# Patient Record
Sex: FEMALE | Race: WHITE | Employment: OTHER | URBAN - METROPOLITAN AREA
[De-identification: names, ages, dates, MRNs, and addresses within clinical notes are randomized per-mention and may not be internally consistent; named-entity substitution may affect disease eponyms.]

---

## 2017-01-27 ENCOUNTER — OFFICE VISIT (OUTPATIENT)
Dept: FAMILY MEDICINE | Facility: CLINIC | Age: 82
End: 2017-01-27
Payer: MEDICARE

## 2017-01-27 VITALS
HEIGHT: 61 IN | WEIGHT: 127 LBS | SYSTOLIC BLOOD PRESSURE: 132 MMHG | BODY MASS INDEX: 23.98 KG/M2 | DIASTOLIC BLOOD PRESSURE: 70 MMHG | HEART RATE: 96 BPM | TEMPERATURE: 97.9 F

## 2017-01-27 DIAGNOSIS — F01.50 VASCULAR DEMENTIA WITHOUT BEHAVIORAL DISTURBANCE (H): Primary | ICD-10-CM

## 2017-01-27 DIAGNOSIS — I69.921 DYSPHASIA DUE TO CEREBROVASCULAR DISEASE: ICD-10-CM

## 2017-01-27 PROCEDURE — 99214 OFFICE O/P EST MOD 30 MIN: CPT | Performed by: FAMILY MEDICINE

## 2017-01-27 NOTE — NURSING NOTE
"Chief Complaint   Patient presents with     Consult       Initial /70 mmHg  Pulse 96  Temp(Src) 97.9  F (36.6  C) (Tympanic)  Ht 5' 1\" (1.549 m)  Wt 127 lb (57.607 kg)  BMI 24.01 kg/m2 Estimated body mass index is 24.01 kg/(m^2) as calculated from the following:    Height as of this encounter: 5' 1\" (1.549 m).    Weight as of this encounter: 127 lb (57.607 kg).  BP completed using cuff size: sandra Gray CMA    "

## 2017-01-27 NOTE — PROGRESS NOTES
SUBJECTIVE:                                                    Miranda Burger is a 83 year old female who presents to clinic today for the following health issues:      Consult    Problem list and histories reviewed & adjusted, as indicated.  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     Dementia, vascular     Past Surgical History   Procedure Laterality Date     Surgical history of -   74     Colonoscopy  04     Tonsillectomy       Breast biopsy, fna rt/lt  95     Lt,      Surgical history of -        Excision large sebaceous cyst, posterior scalp, compound x2     Surgical history of -   07     cataract os     Laparoscopic assisted colectomy  2014     Procedure: LAPAROSCOPIC ASSISTED COLECTOMY;  LAPAROSCOPIC ASSISTED RIGHT HEMICOLECTOMY, CHOLECYSTECTOMY;  Surgeon: Hernandez Hancock MD;  Location:  OR     Cholecystectomy  2014     Procedure: CHOLECYSTECTOMY;;  Surgeon: Hernandez Hancock MD;  Location:  OR       Social History   Substance Use Topics     Smoking status: Never Smoker      Smokeless tobacco: Never Used     Alcohol Use: Yes      Comment: social     Family History   Problem Relation Age of Onset     DIABETES Mother      decesed     DIABETES Sister           Alzheimer Disease Mother      dementia     Cardiovascular Father      decesed         Current Outpatient Prescriptions   Medication Sig Dispense Refill     fluocinonide (LIDEX) 0.05 % cream Apply sparingly to affected area twice daily for 14 days.  Do not  "apply to face. 30 g 1     Calcium Carbonate-Vitamin D (CALCIUM 600 + D OR) Take 2 capsules by mouth daily.       ferrous sulfate 140 (45 FE) MG TBCR Take 2 tablets by mouth daily.       omeprazole (PRILOSEC) 20 MG capsule Take 1 capsule by mouth 2 times daily. 60 capsule 5     VITAMIN D 1000 UNIT PO CAPS 2/day       MULTI-VITAMIN OR TABS None Entered       Allergies   Allergen Reactions     Penicillin [Penicillins] Hives     Sulfa Drugs Hives     Recent Labs   Lab Test  07/25/16   1201  04/30/15   1017  09/04/14   1203   12/23/13   0857  11/09/12   1147   A1C   --    --    --    --    --   5.2   LDL  103*  81  64   --   98  93   HDL  67   --   70   --   45*  53   TRIG  76   --   106   --   101  64   ALT   --   23  18   --   22  42   CR  0.91  0.94  0.89   < >  0.86  0.85   GFRESTIMATED  59*  57*  61   < >  64  65   GFRESTBLACK  72  69  73   < >  77  78   POTASSIUM  4.4  4.5  4.5   < >  4.7  4.5   TSH   --    --   0.78   --   0.88  0.49    < > = values in this interval not displayed.      BP Readings from Last 3 Encounters:   01/27/17 132/70   10/10/16 128/72   07/25/16 126/76    Wt Readings from Last 3 Encounters:   01/27/17 127 lb (57.607 kg)   10/10/16 127 lb (57.607 kg)   07/25/16 124 lb (56.246 kg)                    ROS:  Constitutional, HEENT, cardiovascular, pulmonary, gi and gu systems are negative, except as otherwise noted.    OBJECTIVE:                                                    /70 mmHg  Pulse 96  Temp(Src) 97.9  F (36.6  C) (Tympanic)  Ht 5' 1\" (1.549 m)  Wt 127 lb (57.607 kg)  BMI 24.01 kg/m2  Body mass index is 24.01 kg/(m^2).  GENERAL: healthy, alert and no distress  NECK: no adenopathy, no asymmetry, masses, or scars and thyroid normal to palpation  RESP: lungs clear to auscultation - no rales, rhonchi or wheezes  CV: regular rate and rhythm, normal S1 S2, no S3 or S4, no murmur, click or rub, no peripheral edema and peripheral pulses strong  ABDOMEN: soft, nontender, no " hepatosplenomegaly, no masses and bowel sounds normal  MS: no gross musculoskeletal defects noted, no edema         ASSESSMENT/PLAN:                                                    ASSESSMENT / PLAN:  (F01.50) Vascular dementia without behavioral disturbance  (primary encounter diagnosis)  Comment: has been worsening,   Plan: need to consider placement as was discussed in the past(with caregiver), family member already scheduled to see geriatrics for cognitive testing, which hopefully make her to moving forward to memory care placement with caregiver's agreement     (I69.921) 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 Of this time More than 50% was spent counseling and coordinating of care of the above concerns.    Cheng Galan MD  [facility]

## 2017-02-27 ENCOUNTER — TELEPHONE (OUTPATIENT)
Dept: FAMILY MEDICINE | Facility: CLINIC | Age: 82
End: 2017-02-27

## 2017-02-27 DIAGNOSIS — F41.9 ANXIETY: Primary | ICD-10-CM

## 2017-02-27 RX ORDER — PAROXETINE 10 MG/1
10 TABLET, FILM COATED ORAL AT BEDTIME
Qty: 90 TABLET | Refills: 1 | Status: SHIPPED | OUTPATIENT
Start: 2017-02-27 | End: 2017-04-21

## 2017-02-27 NOTE — TELEPHONE ENCOUNTER
Patient's daughter, Aranza, call to request low dose anti-anxiety medication (consent to communicate on file)   Per, Aranza, patient will be transferring to Memory Care Unit in one week after the Friday - aroun 3/10/17.   She has been having lot of anxiety due to the upcoming move away from her .   Aranza is wondering if you can prescribe a low dose anti-anxiety medication to be use on a as needed basis to get her through this transition.    Pharmacy pended.  Please review and advise.   OK to wait until you return to clinic.   Thank you    Chelle Landry RN

## 2017-03-23 DIAGNOSIS — F41.9 ANXIETY: ICD-10-CM

## 2017-03-23 RX ORDER — PAROXETINE 10 MG/1
10 TABLET, FILM COATED ORAL AT BEDTIME
Qty: 90 TABLET | Refills: 1 | OUTPATIENT
Start: 2017-03-23

## 2017-03-23 NOTE — TELEPHONE ENCOUNTER
Fax received from [facility]    PARoxetine (PAXIL) 10 MG tablet     Last Written Prescription Date: 2/27/17  Last Fill Quantity: 90, # refills: 1  [place] [facility] [place]:  1/27/17        Last PHQ-9 score on record= No flowsheet data found.          Ethel Altman TC

## 2017-04-05 DIAGNOSIS — F41.9 ANXIETY: ICD-10-CM

## 2017-04-05 NOTE — TELEPHONE ENCOUNTER
PARoxetine (PAXIL) 10 MG tablet    Last Written Prescription Date: 2-  Last Fill Quantity: 90, # refills: 1  Last Office Visit with [facility] primary care provider:  1-        Last PHQ-9 score on record= No flowsheet data found.

## 2017-04-07 RX ORDER — PAROXETINE 10 MG/1
10 TABLET, FILM COATED ORAL AT BEDTIME
Qty: 90 TABLET | Refills: 1 | OUTPATIENT
Start: 2017-04-07

## 2017-04-07 NOTE — TELEPHONE ENCOUNTER
Refills available at [facility] pharmacy.      Chelle Landry RN

## 2017-04-21 DIAGNOSIS — F41.9 ANXIETY: ICD-10-CM

## 2017-04-24 RX ORDER — PAROXETINE 10 MG/1
10 TABLET, FILM COATED ORAL AT BEDTIME
Qty: 90 TABLET | Refills: 1 | Status: SHIPPED | OUTPATIENT
Start: 2017-04-24 | End: 2017-10-30

## 2017-04-24 NOTE — TELEPHONE ENCOUNTER
Refill approved through [facility] [place].  Melissa Tolliver RN  [facility]  [phone]

## 2017-04-27 DIAGNOSIS — R05.9 COUGH: Primary | ICD-10-CM

## 2017-04-27 RX ORDER — BENZONATATE 100 MG/1
100 CAPSULE ORAL 2 TIMES DAILY PRN
Qty: 20 CAPSULE | Refills: 0 | Status: SHIPPED | OUTPATIENT
Start: 2017-04-27 | End: 2017-10-30

## 2017-04-27 NOTE — PROGRESS NOTES
Benzonatate was faxed to [facility]  With Physician Order sheet  [phone]  Ethel YU

## 2017-04-28 DIAGNOSIS — J20.9 ACUTE BRONCHITIS WITH SYMPTOMS > 10 DAYS: Primary | ICD-10-CM

## 2017-04-28 RX ORDER — AZITHROMYCIN 250 MG/1
TABLET, FILM COATED ORAL
Qty: 6 TABLET | Refills: 0 | Status: SHIPPED | OUTPATIENT
Start: 2017-04-28 | End: 2017-10-30

## 2017-05-01 ENCOUNTER — TELEPHONE (OUTPATIENT)
Dept: FAMILY MEDICINE | Facility: CLINIC | Age: 82
End: 2017-05-01

## 2017-05-01 DIAGNOSIS — K21.9 GASTROESOPHAGEAL REFLUX DISEASE WITHOUT ESOPHAGITIS: Primary | ICD-10-CM

## 2017-05-01 NOTE — TELEPHONE ENCOUNTER
omeprazole (PRILOSEC) 20 MG       Last Written Prescription Date: 11/26/10  Last Fill Quantity: 60,  # refills: 5   Last Office Visit with [facility], [facility] or [facility] prescribing provider: 1/27/17

## 2017-05-02 NOTE — TELEPHONE ENCOUNTER
Routing refill request to provider for review/approval because:  Prescription dated 2010    Melissa Tolliver,RN  [facility]  [phone]

## 2017-05-19 NOTE — TELEPHONE ENCOUNTER
Salvador Amaya needs to clarify patient's Omeprazole 20 mg DR Dubois order.  Patient was taking medication daily. It is believed that they may have faxed for refills but on 5/2 received an eRx for twice daily. The nurses at  [facility] was not aware of this change. Was the directions meant to change to twice daily or should patient still be taking daily? Please clarify and fax new prescription if daily  [facility] [phone]  Fax: [phone]  Ethel YU

## 2017-05-19 NOTE — TELEPHONE ENCOUNTER
Routing refill request to provider for review/approval because:  See note below.  Order pended for omeprazole 20 mg daily.  Yvonne Sotomayor RN

## 2017-05-19 NOTE — TELEPHONE ENCOUNTER
[facility] and called and spoke with pharmacist to clarify once daily dose.    Melissa Tolliver RN  [facility]  [phone]

## 2017-05-25 ENCOUNTER — TELEPHONE (OUTPATIENT)
Dept: FAMILY MEDICINE | Facility: CLINIC | Age: 82
End: 2017-05-25

## 2017-05-25 DIAGNOSIS — F41.9 ANXIETY: Primary | ICD-10-CM

## 2017-05-25 RX ORDER — CLONAZEPAM 0.5 MG/1
0.5 TABLET, ORALLY DISINTEGRATING ORAL 2 TIMES DAILY
Qty: 60 TABLET | Refills: 3 | Status: SHIPPED | OUTPATIENT
Start: 2017-05-25 | End: 2017-06-16

## 2017-05-25 NOTE — TELEPHONE ENCOUNTER
Paulette [facility] care calling. States patient is a relatively new resident and has been pacing, having lots of anxiety, exit seeking, crying and unconsolable. Wondering if PCP will consider a PRN? She will be sending fax to team 3 for PCP to review and fill out. Sheet can be faxed back to [phone].    Paulette can be reached at [phone].     Anupama Barnett RN   [facility] - Triage

## 2017-06-16 ENCOUNTER — TELEPHONE (OUTPATIENT)
Dept: FAMILY MEDICINE | Facility: CLINIC | Age: 82
End: 2017-06-16

## 2017-06-16 DIAGNOSIS — F09 COGNITIVE DYSFUNCTION: ICD-10-CM

## 2017-06-16 DIAGNOSIS — R45.1 AGITATED: Primary | ICD-10-CM

## 2017-06-16 RX ORDER — HALOPERIDOL 1 MG/1
1 TABLET ORAL 2 TIMES DAILY
Qty: 60 TABLET | Refills: 3 | Status: SHIPPED | OUTPATIENT
Start: 2017-06-16 | End: 2017-06-20

## 2017-06-16 NOTE — TELEPHONE ENCOUNTER
Script and form faxed  Sent form to stat abstracting    Fax number for prescriptions: [phone]   unless its a hard copy then it goes to [facility]       [address] [facility] Steve [phone]    Ethel YU

## 2017-06-16 NOTE — TELEPHONE ENCOUNTER
Daughter calling and very distressed  States that the memory care center has contacted our clinic every day this week without a responce  Mother is recently admitted to memory care-   She is declining very fast over the last week   Will urinate or deficate when ever and wherever she wants- toliting program is not working  becoming physically aggressive: hitting staff and biting  Disrobing and running into other residents rooms  They are having to physically remove her from other staff rooms    Not sleeping- very agitated    Fax number for prescriptions: [phone]   unless its a hard copy then it goes to [facility]      [address] [facility] -[phone]    Health Unit Coordinator: Bruce [phone]

## 2017-06-16 NOTE — TELEPHONE ENCOUNTER
To  to fax prescription today    Called and spoke with RN and notified that prescription would be faxed today.  Daughter notified also.    Melissa Tolliver RN  [facility]  [phone]

## 2017-06-16 NOTE — TELEPHONE ENCOUNTER
I don't think her behavioral change is the side effect from Klonopin, but it is from worsening her sx of cognitive dysfunction.    Please have her to switch Klonopin to Haloperidol bid for stabilize her behavioral change.    Haloperidol prescription placed at [facility]. Please fax back to them  thx

## 2017-06-20 DIAGNOSIS — R45.1 AGITATED: ICD-10-CM

## 2017-06-20 DIAGNOSIS — F09 COGNITIVE DYSFUNCTION: ICD-10-CM

## 2017-06-20 RX ORDER — HALOPERIDOL 1 MG/1
1 TABLET ORAL 3 TIMES DAILY
Qty: 90 TABLET | Refills: 3 | Status: SHIPPED | OUTPATIENT
Start: 2017-06-20 | End: 2017-09-08

## 2017-06-22 ENCOUNTER — TELEPHONE (OUTPATIENT)
Dept: FAMILY MEDICINE | Facility: CLINIC | Age: 82
End: 2017-06-22

## 2017-06-22 NOTE — TELEPHONE ENCOUNTER
EDISON Barrera with [facility] Regina Brady calling. She is checking on status of fax for orders that she sent yesterday. Routing to team to advise if fax was received. Please call Bruce at [phone] if orders need to be re-faxed.     Anupama Barnett RN   [facility] - Triage

## 2017-06-23 NOTE — TELEPHONE ENCOUNTER
Order was faxed back to Sandra on 6/22 and brett at 3:29 on 6/23. I left a message at the number below to notify them. I was unable to speak with Bruce.  Elyse Encinas,

## 2017-07-13 DIAGNOSIS — R45.1 AGITATED: ICD-10-CM

## 2017-07-13 DIAGNOSIS — F09 COGNITIVE DYSFUNCTION: ICD-10-CM

## 2017-07-13 RX ORDER — HALOPERIDOL 1 MG/1
1 TABLET ORAL 3 TIMES DAILY
Qty: 90 TABLET | Refills: 3 | Status: CANCELLED | OUTPATIENT
Start: 2017-07-13

## 2017-07-13 NOTE — TELEPHONE ENCOUNTER
Medication was just filled for 1 year. Request denied.   Anupama Barnett RN   [facility] - Triage

## 2017-07-13 NOTE — TELEPHONE ENCOUNTER
haloperidol (HALDOL) 1 MG tablet   Last Written Prescription Date: 6-  Last Fill Quantity: 90,  # refills: 3   Last Office Visit with [facility], [facility] or [facility] prescribing provider: 1-

## 2017-09-02 DIAGNOSIS — F41.9 ANXIETY: ICD-10-CM

## 2017-09-05 RX ORDER — PAROXETINE 10 MG/1
TABLET, FILM COATED ORAL
Qty: 90 TABLET | Refills: 0 | Status: SHIPPED | OUTPATIENT
Start: 2017-09-05 | End: 2018-03-06

## 2017-09-05 NOTE — TELEPHONE ENCOUNTER
Paroxetine      Last Written Prescription Date: 4/24/17  Last Fill Quantity: 90, # refills: 1  [place] [facility] [place]:  1/27/17        Last PHQ-9 score on record= No flowsheet data found.      Blessing Gray CMA

## 2017-09-05 NOTE — TELEPHONE ENCOUNTER
Refill approved through [facility] protocol.  Melissa Tolliver RN  [facility]  [phone]

## 2017-09-08 DIAGNOSIS — R45.1 AGITATED: ICD-10-CM

## 2017-09-08 DIAGNOSIS — F09 COGNITIVE DYSFUNCTION: ICD-10-CM

## 2017-09-08 NOTE — TELEPHONE ENCOUNTER
haloperidol (HALDOL) 1 MG tablet   Last Written Prescription Date: 6-  Last Fill Quantity: 90,  # refills: 3   Last Office Visit with [facility], [facility] or [facility] prescribing provider: 1-

## 2017-09-11 RX ORDER — HALOPERIDOL 1 MG/1
1 TABLET ORAL 3 TIMES DAILY
Qty: 90 TABLET | Refills: 2 | Status: SHIPPED | OUTPATIENT
Start: 2017-09-11 | End: 2017-09-21

## 2017-09-11 NOTE — TELEPHONE ENCOUNTER
Routing refill request to provider for review/approval because:  Patient has current prescription, but was marked local print. I was unable to locate which pharmacy it went to. Called both [facility] and [facility].  Yvonne Sotomayor RN

## 2017-09-21 DIAGNOSIS — R45.1 AGITATION: Primary | ICD-10-CM

## 2017-09-21 RX ORDER — HALOPERIDOL 0.5 MG/1
0.5 TABLET ORAL 2 TIMES DAILY PRN
Qty: 60 TABLET | Refills: 3 | Status: SHIPPED | OUTPATIENT
Start: 2017-09-21 | End: 2017-10-30 | Stop reason: DRUGHIGH

## 2017-10-30 ENCOUNTER — OFFICE VISIT (OUTPATIENT)
Dept: FAMILY MEDICINE | Facility: CLINIC | Age: 82
End: 2017-10-30
Payer: MEDICARE

## 2017-10-30 VITALS
DIASTOLIC BLOOD PRESSURE: 78 MMHG | TEMPERATURE: 97.2 F | BODY MASS INDEX: 24.17 KG/M2 | SYSTOLIC BLOOD PRESSURE: 128 MMHG | RESPIRATION RATE: 16 BRPM | HEIGHT: 61 IN | OXYGEN SATURATION: 100 % | WEIGHT: 128 LBS | HEART RATE: 85 BPM

## 2017-10-30 DIAGNOSIS — Z91.81 AT RISK FOR FALLING: ICD-10-CM

## 2017-10-30 DIAGNOSIS — R79.89 ELEVATED PROLACTIN LEVEL: ICD-10-CM

## 2017-10-30 DIAGNOSIS — Z23 NEED FOR PROPHYLACTIC VACCINATION WITH TETANUS-DIPHTHERIA (TD): ICD-10-CM

## 2017-10-30 DIAGNOSIS — F01.50 VASCULAR DEMENTIA WITHOUT BEHAVIORAL DISTURBANCE (H): Primary | ICD-10-CM

## 2017-10-30 DIAGNOSIS — R25.9 ABNORMAL INVOLUNTARY MOVEMENT: ICD-10-CM

## 2017-10-30 DIAGNOSIS — Z23 NEED FOR PROPHYLACTIC VACCINATION AND INOCULATION AGAINST INFLUENZA: ICD-10-CM

## 2017-10-30 DIAGNOSIS — R79.89 ELEVATED TSH: ICD-10-CM

## 2017-10-30 LAB
BASOPHILS # BLD AUTO: 0 10E9/L (ref 0–0.2)
BASOPHILS NFR BLD AUTO: 0.4 %
DIFFERENTIAL METHOD BLD: ABNORMAL
EOSINOPHIL # BLD AUTO: 0.2 10E9/L (ref 0–0.7)
EOSINOPHIL NFR BLD AUTO: 2.3 %
ERYTHROCYTE [DISTWIDTH] IN BLOOD BY AUTOMATED COUNT: 12.8 % (ref 10–15)
HCT VFR BLD AUTO: 42.1 % (ref 35–47)
HGB BLD-MCNC: 13.2 G/DL (ref 11.7–15.7)
LYMPHOCYTES # BLD AUTO: 1.5 10E9/L (ref 0.8–5.3)
LYMPHOCYTES NFR BLD AUTO: 19.3 %
MCH RBC QN AUTO: 30.3 PG (ref 26.5–33)
MCHC RBC AUTO-ENTMCNC: 31.4 G/DL (ref 31.5–36.5)
MCV RBC AUTO: 97 FL (ref 78–100)
MONOCYTES # BLD AUTO: 0.9 10E9/L (ref 0–1.3)
MONOCYTES NFR BLD AUTO: 11.1 %
NEUTROPHILS # BLD AUTO: 5.2 10E9/L (ref 1.6–8.3)
NEUTROPHILS NFR BLD AUTO: 66.9 %
PLATELET # BLD AUTO: 317 10E9/L (ref 150–450)
RBC # BLD AUTO: 4.35 10E12/L (ref 3.8–5.2)
WBC # BLD AUTO: 7.8 10E9/L (ref 4–11)

## 2017-10-30 PROCEDURE — 84146 ASSAY OF PROLACTIN: CPT | Performed by: FAMILY MEDICINE

## 2017-10-30 PROCEDURE — 99214 OFFICE O/P EST MOD 30 MIN: CPT | Performed by: FAMILY MEDICINE

## 2017-10-30 PROCEDURE — 80050 GENERAL HEALTH PANEL: CPT | Performed by: FAMILY MEDICINE

## 2017-10-30 PROCEDURE — 36415 COLL VENOUS BLD VENIPUNCTURE: CPT | Performed by: FAMILY MEDICINE

## 2017-10-30 RX ORDER — FERROUS SULFATE 325(65) MG
TABLET ORAL DAILY
Refills: 0 | COMMUNITY
Start: 2017-10-26 | End: 2019-12-05

## 2017-10-30 RX ORDER — MAGNESIUM HYDROXIDE/ALUMINUM HYDROXICE/SIMETHICONE 120; 1200; 1200 MG/30ML; MG/30ML; MG/30ML
SUSPENSION ORAL EVERY 4 HOURS PRN
COMMUNITY

## 2017-10-30 RX ORDER — HALOPERIDOL 1 MG/1
1 TABLET ORAL 2 TIMES DAILY
COMMUNITY
End: 2017-12-07

## 2017-10-30 RX ORDER — GUAIFENESIN AND DEXTROMETHORPHAN HYDROBROMIDE 100; 10 MG/5ML; MG/5ML
5 SOLUTION ORAL EVERY 4 HOURS PRN
COMMUNITY

## 2017-10-30 NOTE — MR AVS SNAPSHOT
After Visit Summary   10/30/2017    Miranda Burger    MRN: [id number]           Patient Information     Date Of Birth          10/8/1933        Visit Information        Provider Department      10/30/2017 2:20 PM Cheng Galan MD [facility]        Today's Diagnoses     Vascular dementia without behavioral disturbance    -  1    At risk for falling        Need for prophylactic vaccination and inoculation against influenza        Need for prophylactic vaccination with tetanus-diphtheria (TD)        Elevated TSH        Elevated prolactin level (H)        Abnormal involuntary movement           Follow-ups after your visit        Who to contact     If you have questions or need follow up information about today's clinic visit or your schedule please contact [facility] directly at [phone].  Normal or non-critical lab and imaging results will be communicated to you by [employer], letter or phone within 4 business days after the clinic has received the results. If you do not hear from us within 7 days, please contact the clinic through [employer] or phone. If you have a critical or abnormal lab result, we will notify you by phone as soon as possible.  Submit refill requests through [employer] or call your pharmacy and they will forward the refill request to us. Please allow 3 business days for your refill to be completed.          Additional Information About Your Visit        MyChart Information     [employer] gives you secure access to your electronic health record. If you see a primary care provider, you can also send messages to your care team and make appointments. If you have questions, please call your primary care clinic.  If you do not have a primary care provider, please call [phone] and they will assist you.        Care EveryWhere ID     This is your Care EveryWhere ID. This could be used by other organizations to access your [facility]  "records  [id number]        Your Vitals Were     Pulse Temperature Respirations Height Pulse Oximetry BMI (Body Mass Index)    85 97.2  F (36.2  C) (Tympanic) 16 5' 1\" (1.549 m) 100% 24.19 kg/m2       Blood Pressure from Last 3 Encounters:   10/30/17 128/78   01/27/17 132/70   10/10/16 128/72    Weight from Last 3 Encounters:   10/30/17 128 lb (58.1 kg)   01/27/17 127 lb (57.6 kg)   10/10/16 127 lb (57.6 kg)              We Performed the Following     CBC with platelets and differential     Comprehensive metabolic panel     Prolactin     TSH with free T4 reflex          Today's Medication Changes          These changes are accurate as of: 10/30/17  2:58 PM.  If you have any questions, ask your nurse or doctor.               These medicines have changed or have updated prescriptions.        Dose/Directions    haloperidol 1 MG tablet   Commonly known as:  HALDOL   This may have changed:  Another medication with the same name was removed. Continue taking this medication, and follow the directions you see here.   Changed by:  Cheng Galan MD        Dose:  1 mg   Take 1 mg by mouth 2 times daily   Refills:  0                Primary Care Provider Office Phone # Fax #    Cheng Galan [phone]       [address] [id number]        Equal Access to Services     [facility]: Hadii sukhwinder ku hadasho Soomaali, waaxda luqadaha, qaybta kaalmada adeegyada, tammy boyce. So [facility] [phone].    ATENCIÓN: Si habla español, tiene a galvez disposición servicios gratuitos de asistencia lingüística. Llame al [phone].    We comply with applicable federal civil rights laws and [place] laws. We do not discriminate on the basis of race, color, national origin, age, disability, sex, sexual orientation, or gender identity.            Thank you!     Thank you for choosing [facility]  for your care. Our goal is always to provide you with excellent care. " Hearing back from our patients is one way we can continue to improve our services. Please take a few minutes to complete the written survey that you may receive in the mail after your visit with us. Thank you!             Your Updated Medication List - Protect others around you: Learn how to safely use, store and throw away your medicines at www.disposemymeds.org.          This list is accurate as of: 10/30/17  2:58 PM.  Always use your most recent med list.                   Brand Name Dispense Instructions for use Diagnosis    ACETAMINOPHEN PO      Take 325 mg by mouth daily        BENZONATATE PO      Take 100 mg by mouth every 4 hours as needed for cough        CALCIUM 600 + D PO      Take 2 capsules by mouth daily.        Dextromethorphan-guaiFENesin  MG/5ML syrup      Take 5 mLs by mouth every 4 hours as needed for cough        ferrous sulfate 325 (65 FE) MG tablet    IRON     daily        fluocinonide 0.05 % cream    LIDEX    30 g    Apply sparingly to affected area twice daily for 14 days.  Do not apply to face.    Rash and other nonspecific skin eruption       haloperidol 1 MG tablet    HALDOL     Take 1 mg by mouth 2 times daily        magnesium hydroxide 400 MG/5ML suspension    MILK OF MAGNESIA     Take by mouth daily as needed for constipation or heartburn        MINTOX 200-200-20 MG/5ML Susp suspension   Generic drug:  alum & mag hydroxide-simethicone      Take by mouth every 4 hours as needed for indigestion        Multi-vitamin Tabs tablet   Generic drug:  multivitamin, therapeutic with minerals      None Entered        omeprazole 20 MG CR capsule    priLOSEC    30 capsule    Take 1 capsule (20 mg) by mouth daily    Gastroesophageal reflux disease without esophagitis       PARoxetine 10 MG tablet    PAXIL    90 tablet    TAKE 1 TABLET (10 MG) BY MOUTH AT BEDTIME    Anxiety       vitamin D 1000 UNITS capsule      2/day        ZICAM COLD REMEDY PO      Take by mouth as needed

## 2017-10-30 NOTE — NURSING NOTE
"Chief Complaint   Patient presents with     Spasms       Initial /78 (Cuff Size: Adult Regular)  Pulse 85  Temp 97.2  F (36.2  C) (Tympanic)  Resp 16  Ht 5' 1\" (1.549 m)  Wt 128 lb (58.1 kg)  SpO2 100%  BMI 24.19 kg/m2 Estimated body mass index is 24.19 kg/(m^2) as calculated from the following:    Height as of this encounter: 5' 1\" (1.549 m).    Weight as of this encounter: 128 lb (58.1 kg).  Medication Reconciliation: complete   Na Garrett, CMA    "

## 2017-10-30 NOTE — PROGRESS NOTES
SUBJECTIVE:   Miranda Burger is a 84 year old female who presents to clinic today for the following health issues:      Musculoskeletal problem/pain      Duration: 1-2 weeks     Description  Location: left shoulder twitching     Intensity:  mild    Accompanying signs and symptoms: none    History  Previous similar problem: no   Previous evaluation:  none    Precipitating or alleviating factors:  Trauma or overuse: no   Aggravating factors include: none    Therapies tried and outcome: tried wearing a different bra         Problem list and histories reviewed & adjusted, as indicated.  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     Dementia, vascular     Past Surgical History:   Procedure Laterality Date     BREAST BIOPSY, FNA RT/LT  01/12/95    Lt,      CHOLECYSTECTOMY  1/13/2014    Procedure: CHOLECYSTECTOMY;;  Surgeon: Hernandez Hancock MD;  Location:  OR     COLONOSCOPY  07/21/04     LAPAROSCOPIC ASSISTED COLECTOMY  1/13/2014    Procedure: LAPAROSCOPIC ASSISTED COLECTOMY;  LAPAROSCOPIC ASSISTED RIGHT HEMICOLECTOMY, CHOLECYSTECTOMY;  Surgeon: Hernandez Hancock MD;  Location:  OR     SURGICAL HISTORY OF -   09/20/74     SURGICAL HISTORY OF -   1998    Excision large sebaceous cyst, posterior scalp, compound x2     SURGICAL HISTORY OF -   12/19/07    cataract os     TONSILLECTOMY         Social History   Substance Use Topics     Smoking status: Never Smoker     Smokeless tobacco: Never Used     Alcohol use Yes      Comment: social     Family History   Problem Relation  Age of Onset     DIABETES Mother      decesed     DIABETES Sister           Alzheimer Disease Mother      dementia     Cardiovascular Father      decesed         Current Outpatient Prescriptions   Medication Sig Dispense Refill     ferrous sulfate (IRON) 325 (65 FE) MG tablet daily  0     ACETAMINOPHEN PO Take 325 mg by mouth daily       Dextromethorphan-guaiFENesin  MG/5ML syrup Take 5 mLs by mouth every 4 hours as needed for cough       BENZONATATE PO Take 100 mg by mouth every 4 hours as needed for cough       haloperidol (HALDOL) 1 MG tablet Take 1 mg by mouth 2 times daily       magnesium hydroxide (MILK OF MAGNESIA) 400 MG/5ML suspension Take by mouth daily as needed for constipation or heartburn       Homeopathic Products (ZICAM COLD REMEDY PO) Take by mouth as needed       alum & mag hydroxide-simethicone (MINTOX) 200-200-20 MG/5ML SUSP suspension Take by mouth every 4 hours as needed for indigestion       PARoxetine (PAXIL) 10 MG tablet TAKE 1 TABLET (10 MG) BY MOUTH AT BEDTIME 90 tablet 0     omeprazole (PRILOSEC) 20 MG CR capsule Take 1 capsule (20 mg) by mouth daily 30 capsule 5     Calcium Carbonate-Vitamin D (CALCIUM 600 + D OR) Take 2 capsules by mouth daily.       VITAMIN D 1000 UNIT PO CAPS 2/day       MULTI-VITAMIN OR TABS None Entered       [DISCONTINUED] PARoxetine (PAXIL) 10 MG tablet Take 1 tablet (10 mg) by mouth At Bedtime 90 tablet 1     fluocinonide (LIDEX) 0.05 % cream Apply sparingly to affected area twice daily for 14 days.  Do not apply to face. (Patient not taking: Reported on 10/30/2017) 30 g 1     Allergies   Allergen Reactions     Penicillin [Penicillins] Hives     Sulfa Drugs Hives     Recent Labs   Lab Test  16   1201  04/30/15   1017  14   1203   13   0857  12   1147   A1C   --    --    --    --    --   5.2   LDL  103*  81  64   --   98  93   HDL  67   --   70   --   45*  53   TRIG  76   --   106   --   101  64   ALT   --   23  18   --   22   "42   CR  0.91  0.94  0.89   < >  0.86  0.85   GFRESTIMATED  59*  57*  61   < >  64  65   GFRESTBLACK  72  69  73   < >  77  78   POTASSIUM  4.4  4.5  4.5   < >  4.7  4.5   TSH   --    --   0.78   --   0.88  0.49    < > = values in this interval not displayed.      BP Readings from Last 3 Encounters:   10/30/17 128/78   01/27/17 132/70   10/10/16 128/72    Wt Readings from Last 3 Encounters:   10/30/17 128 lb (58.1 kg)   01/27/17 127 lb (57.6 kg)   10/10/16 127 lb (57.6 kg)              Reviewed and updated as needed this visit by clinical staff     Reviewed and updated as needed this visit by Provider         ROS:  Constitutional, HEENT, cardiovascular, pulmonary, gi and gu systems are negative, except as otherwise noted.      OBJECTIVE:   /78 (Cuff Size: Adult Regular)  Pulse 85  Temp 97.2  F (36.2  C) (Tympanic)  Resp 16  Ht 5' 1\" (1.549 m)  Wt 128 lb (58.1 kg)  SpO2 100%  BMI 24.19 kg/m2  Body mass index is 24.19 kg/(m^2).  GENERAL: healthy, alert and no distress  NECK: no adenopathy, no asymmetry, masses, or scars and thyroid normal to palpation  RESP: lungs clear to auscultation - no rales, rhonchi or wheezes  CV: regular rate and rhythm, normal S1 S2, no S3 or S4, no murmur, click or rub, no peripheral edema and peripheral pulses strong  ABDOMEN: soft, nontender, no hepatosplenomegaly, no masses and bowel sounds normal  MS: no gross musculoskeletal defects noted, no edema        ASSESSMENT/PLAN:   ASSESSMENT / PLAN:  (F01.50) Vascular dementia without behavioral disturbance  (primary encounter diagnosis)  Comment: has been worsening with behavioral disturbance at NH(memory care), had report from then that she has limb twitching intermittently,   Unfortunately she has no sx currently, she is on haloperidol prn, will have her to keep on haloperidol and encourage recording when she has that for me to assess if is extrapyramidal sx,   In the case if it extrapyramidal sx, will change med to Seroquel " and ativan combination   Plan: Comprehensive metabolic panel, CBC with         platelets and differential, Prolactin, TSH with        free T4 reflex            (Z91.81) At risk for falling      (Z23) Need for prophylactic vaccination and inoculation against influenza      (Z23) Need for prophylactic vaccination with tetanus-diphtheria (TD)      (R94.6) Elevated TSH  Comment: had elevated TSH, she has been on haldol and sx mentioned above, will check lab and update pt   Plan: Comprehensive metabolic panel, CBC with         platelets and differential, Prolactin, TSH with        free T4 reflex            (E22.9) Elevated prolactin level (H)  Comment: had in the past, currently on haldol, will check lab and update pt   Plan: Comprehensive metabolic panel, CBC with         platelets and differential, Prolactin, TSH with        free T4 reflex            (R25.9) Abnormal involuntary movement  Comment: mentioned above   Plan: mentioned above       RTC in 4 months     Cheng Galan MD  [facility]

## 2017-10-31 LAB
ALBUMIN SERPL-MCNC: 3.4 G/DL (ref 3.4–5)
ALP SERPL-CCNC: 84 U/L (ref 40–150)
ALT SERPL W P-5'-P-CCNC: 18 U/L (ref 0–50)
ANION GAP SERPL CALCULATED.3IONS-SCNC: 8 MMOL/L (ref 3–14)
AST SERPL W P-5'-P-CCNC: 7 U/L (ref 0–45)
BILIRUB SERPL-MCNC: 0.3 MG/DL (ref 0.2–1.3)
BUN SERPL-MCNC: 15 MG/DL (ref 7–30)
CALCIUM SERPL-MCNC: 9.4 MG/DL (ref 8.5–10.1)
CHLORIDE SERPL-SCNC: 105 MMOL/L (ref 94–109)
CO2 SERPL-SCNC: 27 MMOL/L (ref 20–32)
CREAT SERPL-MCNC: 0.89 MG/DL (ref 0.52–1.04)
GFR SERPL CREATININE-BSD FRML MDRD: 60 ML/MIN/1.7M2
GLUCOSE SERPL-MCNC: 89 MG/DL (ref 70–99)
POTASSIUM SERPL-SCNC: 4.7 MMOL/L (ref 3.4–5.3)
PROT SERPL-MCNC: 6.4 G/DL (ref 6.8–8.8)
SODIUM SERPL-SCNC: 140 MMOL/L (ref 133–144)
TSH SERPL DL<=0.005 MIU/L-ACNC: 0.94 MU/L (ref 0.4–4)

## 2017-11-01 LAB — PROLACTIN SERPL-MCNC: 28 UG/L (ref 3–27)

## 2017-12-07 DIAGNOSIS — R45.1 AGITATION: Primary | ICD-10-CM

## 2017-12-07 RX ORDER — HALOPERIDOL 1 MG/1
1 TABLET ORAL 2 TIMES DAILY
Qty: 180 TABLET | Refills: 1 | Status: SHIPPED | OUTPATIENT
Start: 2017-12-07 | End: 2018-06-13

## 2017-12-07 NOTE — TELEPHONE ENCOUNTER
haloperidol (HALDOL) 1 MG      Last Written Prescription Date: historical  Last Fill Quantity: ?,  # refills: ?   Last Office Visit with [facility], [facility] or [facility] prescribing provider: 10/30/17

## 2017-12-07 NOTE — TELEPHONE ENCOUNTER
Per med reconcile Dispense    Miranda Burger     (MR # [id number])         Medication Dispense Information      HALOPERIDOL 1MG TAB     Sig: TAKE 1 TAB BY MOUTH TWICE A DAY FOR ANXIETY     Dispensed: 12/6/2017 12:00 AM     Written:  11/21/2017     Days supply: 14     Quantity: 28 each     Prescribed refills: 0     Pharmacy: [id number] - [place], [place] - [address] [facility]   [address] [place] [address] [address]   [facility] [address]   Phone: [phone]   Fax: [phone]     Authorizing provider: JAMES BELLO     Received from: [employer] (Fill History, Ambulatory)

## 2017-12-07 NOTE — TELEPHONE ENCOUNTER
Routing refill request to provider for review/approval because:  Drug not on the [facility] refill protocol   Medication is reported/historical

## 2018-03-06 DIAGNOSIS — F41.9 ANXIETY: ICD-10-CM

## 2018-03-07 NOTE — TELEPHONE ENCOUNTER
"Requested Prescriptions   Pending Prescriptions Disp Refills     PARoxetine (PAXIL) 10 MG tablet  Last Written Prescription Date:  9/5/17  Last Fill Quantity: 90,  # refills: 0   Last office visit: 10/30/2017 with prescribing provider:  10/30/17   Future Office Visit:     90 tablet 0    SSRIs Protocol Passed    3/6/2018  3:45 PM   No flowsheet data found.  No flowsheet data found.          Passed - Recent (12 mo) or future (30 days) visit within the authorizing provider's specialty    Patient had office visit in the last year or has a visit in the next 30 days with authorizing provider.  See \"Patient Info\" tab in inbasket, or \"Choose Columns\" in Meds & Orders section of the refill encounter.            Passed - Patient is age 18 or older       Passed - No active pregnancy on record       Passed - No positive pregnancy test in last 12 months          "

## 2018-03-08 RX ORDER — PAROXETINE 10 MG/1
TABLET, FILM COATED ORAL
Qty: 30 TABLET | Refills: 0 | Status: SHIPPED | OUTPATIENT
Start: 2018-03-08 | End: 2018-03-30

## 2018-03-08 NOTE — TELEPHONE ENCOUNTER
Associated dx is anxiety. Depression is not on the problem list. Patient has dementia.  Medication is being filled for 1 time refill only due to:  Patient needs to be seen because instructed to follow up in 4 months per LOV note on 10/30/17.   Please help schedule the patient.  Yvonne Sotomayor RN

## 2018-03-26 ENCOUNTER — TRANSFERRED RECORDS (OUTPATIENT)
Dept: HEALTH INFORMATION MANAGEMENT | Facility: CLINIC | Age: 83
End: 2018-03-26

## 2018-03-30 DIAGNOSIS — F41.9 ANXIETY: ICD-10-CM

## 2018-03-30 NOTE — TELEPHONE ENCOUNTER
"Last Written Prescription Date:  3/8/18  Last Fill Quantity: 30,  # refills: 0   Last office visit: 10/30/2017    Future Office Visit:      Requested Prescriptions   Pending Prescriptions Disp Refills     PARoxetine (PAXIL) 10 MG tablet 30 tablet 0     Sig: TAKE 1 TABLET (10 MG) BY MOUTH AT BEDTIME    SSRIs Protocol Passed    3/30/2018  8:52 AM       Passed - Recent (12 mo) or future (30 days) visit within the authorizing provider's specialty    Patient had office visit in the last 12 months or has a visit in the next 30 days with authorizing provider or within the authorizing provider's specialty.  See \"Patient Info\" tab in inbasket, or \"Choose Columns\" in Meds & Orders section of the refill encounter.           Passed - Patient is age 18 or older       Passed - No active pregnancy on record       Passed - No positive pregnancy test in last 12 months        Routing refill request to provider for review/approval because:  Wendy given x1 and patient did not follow up, please advise    Blessing Mcmahon RN  Triage-[employer]          "

## 2018-03-30 NOTE — TELEPHONE ENCOUNTER
Since this was filled on 3/8 patient should have enough for another week. Dr. Galan had requested her to be seen in 4 months (as of October) and she has not been seen so I will defer to him if he would like to refill again.

## 2018-03-31 RX ORDER — PAROXETINE 10 MG/1
TABLET, FILM COATED ORAL
Qty: 90 TABLET | Refills: 1 | Status: SHIPPED | OUTPATIENT
Start: 2018-03-31 | End: 2018-09-18

## 2018-05-02 ENCOUNTER — TRANSFERRED RECORDS (OUTPATIENT)
Dept: HEALTH INFORMATION MANAGEMENT | Facility: CLINIC | Age: 83
End: 2018-05-02

## 2018-05-02 LAB
ALT SERPL-CCNC: 20 U/L (ref 9–55)
AST SERPL-CCNC: 18 U/L (ref 10–40)
CREAT SERPL-MCNC: 0.87 MG/DL (ref 0.55–1.02)
GFR SERPL CREATININE-BSD FRML MDRD: >60 ML/MIN/1.73M2
GLUCOSE SERPL-MCNC: 72 MG/DL (ref 70–100)
POTASSIUM SERPL-SCNC: 4.4 MMOL/L (ref 3.5–5.2)

## 2018-06-13 DIAGNOSIS — R45.1 AGITATION: ICD-10-CM

## 2018-06-13 RX ORDER — HALOPERIDOL 1 MG/1
1 TABLET ORAL 2 TIMES DAILY
Qty: 180 TABLET | Refills: 1 | Status: SHIPPED | OUTPATIENT
Start: 2018-06-13 | End: 2018-09-04

## 2018-06-13 NOTE — TELEPHONE ENCOUNTER
haldol      Last Written Prescription Date:  12/7/17  Last Fill Quantity: 180,   # refills: 1  Last Office Visit: 10/30/17  Future Office visit:       Routing refill request to provider for review/approval because:  Drug not on the [facility], [facility] or [facility] refill protocol or controlled substance    Anupama Barnett RN   [facility] - Triage

## 2018-09-04 DIAGNOSIS — R45.1 AGITATION: ICD-10-CM

## 2018-09-04 NOTE — TELEPHONE ENCOUNTER
Requested Prescriptions   Pending Prescriptions Disp Refills     haloperidol (HALDOL) 1 MG tablet [Pharmacy Med Name: HALOPERIDOL 1MG TAB]  Last Written Prescription Date:  6/13/18  Last Fill Quantity: 180,  # refills: 1   Last office visit: 10/30/2017 with prescribing provider:  nataly   Future Office Visit:     90 tablet      Sig: TAKE 1 TAB BY MOUTH THREE TIMES DAILY FOR ANXIETY    There is no refill protocol information for this order

## 2018-09-05 RX ORDER — HALOPERIDOL 1 MG/1
TABLET ORAL
Qty: 90 TABLET | Refills: 1 | Status: SHIPPED | OUTPATIENT
Start: 2018-09-05 | End: 2018-10-31

## 2018-09-18 DIAGNOSIS — F41.9 ANXIETY: ICD-10-CM

## 2018-09-18 NOTE — TELEPHONE ENCOUNTER
"Requested Prescriptions   Pending Prescriptions Disp Refills     PARoxetine (PAXIL) 10 MG tablet [Pharmacy Med Name: PAROXETINE 10MG TAB]  Last Written Prescription Date:  3/31/18  Last Fill Quantity: 90,  # refills: 1   Last office visit: 10/30/2017 with prescribing provider:  Adama   Future Office Visit:     90 tablet      Sig: TAKE 1 TAB BY MOUTH AT BEDTIME FOR ANXIETY    SSRIs Protocol Passed    9/18/2018 12:56 PM       Passed - Recent (12 mo) or future (30 days) visit within the authorizing provider's specialty    Patient had office visit in the last 12 months or has a visit in the next 30 days with authorizing provider or within the authorizing provider's specialty.  See \"Patient Info\" tab in inbasket, or \"Choose Columns\" in Meds & Orders section of the refill encounter.           Passed - Patient is age 18 or older       Passed - No active pregnancy on record       Passed - No positive pregnancy test in last 12 months          "

## 2018-09-19 RX ORDER — PAROXETINE 10 MG/1
TABLET, FILM COATED ORAL
Qty: 90 TABLET | Refills: 1 | Status: SHIPPED | OUTPATIENT
Start: 2018-09-19 | End: 2019-08-14

## 2018-09-19 NOTE — TELEPHONE ENCOUNTER
Routing to Dr. Galan- triage unable to refill for 6 months:  last note 03/30/18:    Cheng Galan MD       9:16 AM   Note      Pt is under NH care, will refill 6 months supplies if there is no particular clinical change

## 2018-10-31 DIAGNOSIS — R45.1 AGITATION: ICD-10-CM

## 2018-10-31 NOTE — TELEPHONE ENCOUNTER
Haldol  Last Written Prescription Date:  9/5/2018  Last Fill Quantity: 90,   # refills: 1  Last Office Visit: 10/2017  Future Office visit:       Routing refill request to provider for review/approval because:  Drug not on the [facility], [facility] or [facility] refill protocol or controlled substance  It has been 1 year since patient has been seen.  Liana Castillo RN - Triage  [facility]

## 2018-11-01 RX ORDER — HALOPERIDOL 1 MG/1
TABLET ORAL
Qty: 90 TABLET | Refills: 1 | Status: SHIPPED | OUTPATIENT
Start: 2018-11-01 | End: 2019-12-05

## 2018-12-26 ENCOUNTER — TELEPHONE (OUTPATIENT)
Dept: FAMILY MEDICINE | Facility: CLINIC | Age: 83
End: 2018-12-26

## 2018-12-26 DIAGNOSIS — Z12.11 SPECIAL SCREENING FOR MALIGNANT NEOPLASMS, COLON: Primary | ICD-10-CM

## 2018-12-26 DIAGNOSIS — K62.5 RECTAL BLEEDING: ICD-10-CM

## 2018-12-26 NOTE — TELEPHONE ENCOUNTER
Dawna from [facility] in [facility] states pt had a dark brown bm today with 4-6 blood clots that are about dime to quarter sized.  Pt's vitals are normal.  Denies pain.  Rectum looked normal.  Pt is walking around normally.  Ate breakfast fine.  Dawna will be sending a fax over with information and wondering if md would like labs done or have pt seen?    Dawna can be reached at [phone].    Sherie JACOBO RN  EP Triage

## 2018-12-27 NOTE — TELEPHONE ENCOUNTER
It it keeps happening ,she might need FIT test then colonoscopy eventually  FIT tests order placed, please let them know   thx

## 2018-12-31 DIAGNOSIS — R45.1 AGITATION: Primary | ICD-10-CM

## 2018-12-31 NOTE — TELEPHONE ENCOUNTER
Requested Prescriptions   Pending Prescriptions Disp Refills     haloperidol (HALDOL) 2 MG/ML (HIGH CONC) solution [Pharmacy Med Name:  Last Written Prescription Date:  11-1-2018  Last Fill Quantity: 90 tabalet,  # refills: 1   Last office visit: 10/30/2017 with prescribing provider:     Future Office Visit:     HALOPERIDOL LACTATE 2MG/ML SOL] 50 mL      Sig: TAKE 0.5ML (1MG) BY MOUTH THREE TIMES DAILY FOR ANXIETY    There is no refill protocol information for this order

## 2019-01-02 RX ORDER — HALOPERIDOL 2 MG/ML
SOLUTION ORAL
Qty: 90 ML | Refills: 0 | Status: SHIPPED | OUTPATIENT
Start: 2019-01-02 | End: 2019-02-08

## 2019-01-02 NOTE — TELEPHONE ENCOUNTER
Routing refill request to provider for review/approval because:  Drug not on the [facility] refill protocol     Sherie JACOBO RN  EP Triage

## 2019-01-02 NOTE — TELEPHONE ENCOUNTER
"Called and spoke with the pharmacist at [facility]:  States that \"Crystal\" was notified that there is a back order on tablets - and this was changed on 12/7/18 to liquid.  Not sure who this was that the pharmacist spoke with- we do not have a Crystal here-  They are giving her the same dosage- just in liquid form due to the back order.    Melissa Tolliver RN BSN  [facility]  [phone]    "

## 2019-01-04 ENCOUNTER — TELEPHONE (OUTPATIENT)
Dept: FAMILY MEDICINE | Facility: CLINIC | Age: 84
End: 2019-01-04

## 2019-01-04 NOTE — TELEPHONE ENCOUNTER
Sandra called regarding the FIT test and what it was and how to get one. FIT test mailed to Sandra attn: Dawna Encinas,

## 2019-01-11 DIAGNOSIS — K62.5 RECTAL BLEEDING: ICD-10-CM

## 2019-01-11 DIAGNOSIS — Z12.11 SPECIAL SCREENING FOR MALIGNANT NEOPLASMS, COLON: ICD-10-CM

## 2019-01-12 LAB — HEMOCCULT STL QL IA: NEGATIVE

## 2019-01-12 PROCEDURE — 82274 ASSAY TEST FOR BLOOD FECAL: CPT | Performed by: FAMILY MEDICINE

## 2019-01-21 ENCOUNTER — TELEPHONE (OUTPATIENT)
Dept: FAMILY MEDICINE | Facility: CLINIC | Age: 84
End: 2019-01-21

## 2019-01-21 NOTE — TELEPHONE ENCOUNTER
Form completed and signed by Dr. Galan faxed to [facility] and [phone].      .Annika ZHOU    [facility]

## 2019-02-08 DIAGNOSIS — R45.1 AGITATION: ICD-10-CM

## 2019-02-08 RX ORDER — HALOPERIDOL 2 MG/ML
SOLUTION ORAL
Qty: 90 ML | Refills: 0 | Status: SHIPPED | OUTPATIENT
Start: 2019-02-08 | End: 2019-04-30

## 2019-02-08 NOTE — TELEPHONE ENCOUNTER
Routing refill request to provider for review/approval because:  Drug not on the [facility] refill protocol     Paulette DIASN, RN   [facility]

## 2019-02-08 NOTE — TELEPHONE ENCOUNTER
Requested Prescriptions   Pending Prescriptions Disp Refills     haloperidol (HALDOL) 2 MG/ML (HIGH CONC) solution [Pharmacy Med Name: HALOPERIDOL LACTATE 2MG/ML SOL] 90 mL 0     Sig: TAKE 0.5ML (1MG) BY MOUTH THREE TIMES DAILY FOR DEMENTIA W/BEHAVIORDISTURBANCE    There is no refill protocol information for this order        Last Written Prescription Date:  1/2/2019  Last Fill Quantity: 90,  # refills: 0   Last office visit: 10/30/2017 with prescribing provider:  10/30/17   Future Office Visit:

## 2019-03-05 ENCOUNTER — MEDICAL CORRESPONDENCE (OUTPATIENT)
Dept: HEALTH INFORMATION MANAGEMENT | Facility: CLINIC | Age: 84
End: 2019-03-05

## 2019-03-20 ENCOUNTER — TRANSFERRED RECORDS (OUTPATIENT)
Dept: HEALTH INFORMATION MANAGEMENT | Facility: CLINIC | Age: 84
End: 2019-03-20

## 2019-03-20 LAB
CHOLEST SERPL-MCNC: 135 MG/DL (ref 0–199)
CREAT SERPL-MCNC: 0.83 MG/DL (ref 0.55–1.02)
GFR SERPL CREATININE-BSD FRML MDRD: >60 ML/MIN/1.73M2
GLUCOSE SERPL-MCNC: 80 MG/DL (ref 70–100)
HDLC SERPL-MCNC: 41 MG/DL
LDLC SERPL CALC-MCNC: 79 MG/DL (ref 19–130)
NONHDLC SERPL-MCNC: 94 MG/DL (ref 0–159)
POTASSIUM SERPL-SCNC: 4.3 MMOL/L (ref 3.5–5.2)
TRIGL SERPL-MCNC: 75 MG/DL (ref 4–149)

## 2019-04-09 ENCOUNTER — TELEPHONE (OUTPATIENT)
Dept: FAMILY MEDICINE | Facility: CLINIC | Age: 84
End: 2019-04-09

## 2019-04-09 NOTE — TELEPHONE ENCOUNTER
Home care calling - [address] - alessio HOGAN     Asking if the results from the FIT test results from Jan     Because - there was a small amount of blood noted on the outside of the stool today - stool is soft and formed  Denies: rectal pain, or change in bowel habits     They said they will monitor pt     Routing to PCP as an FYI     Fax [phone]    Faxed results to valentina Martinez RN, BSN  [facility]

## 2019-04-30 DIAGNOSIS — R45.1 AGITATION: ICD-10-CM

## 2019-05-01 RX ORDER — HALOPERIDOL 2 MG/ML
SOLUTION ORAL
Qty: 90 ML | Refills: 0 | Status: SHIPPED | OUTPATIENT
Start: 2019-05-01 | End: 2019-08-16

## 2019-05-01 NOTE — TELEPHONE ENCOUNTER
Routing refill request to provider for review/approval because:  Drug not on the [facility] refill protocol   Patient has not been seen since 10/30/17    Anupama Barnett RN   [facility] - Triage

## 2019-05-01 NOTE — TELEPHONE ENCOUNTER
Requested Prescriptions   Pending Prescriptions Disp Refills     haloperidol (HALDOL) 2 MG/ML (HIGH CONC) solution [Pharmacy Med Name: HALOPERIDOL LACTATE 2MG/ML SOL] 90 mL 0     Sig: TAKE 0.5ML (1MG) BY MOUTH THREE TIMES DAILY FOR DEMENTIA W/BEHAVIORDISTURBANCE       There is no refill protocol information for this order   Last Written Prescription Date:  2/8/2019  Last Fill Quantity: 90 mL,  # refills: 0   Last office visit: 10/30/2017 with prescribing provider:  HIMA Galan  Future Office Visit:

## 2019-07-24 ENCOUNTER — TELEPHONE (OUTPATIENT)
Dept: FAMILY MEDICINE | Facility: CLINIC | Age: 84
End: 2019-07-24

## 2019-07-24 DIAGNOSIS — R45.0 JITTERY: Primary | ICD-10-CM

## 2019-07-24 NOTE — TELEPHONE ENCOUNTER
Monitoring over next 5 days, and considering adding melatonin  Please have them to update us in 5 days.

## 2019-07-24 NOTE — TELEPHONE ENCOUNTER
Called patient, the number that was dialed is no longer in service, this is the only number on file. Unable to leave a voicemail.     Lili Cormier RN, BSN  [facility]

## 2019-07-24 NOTE — TELEPHONE ENCOUNTER
Patient has been exhibited anxiety, difficulties sleeping, wondering more, more difficult to redirect. Please call if there are any changes pcp would like to make.        .Annika ZHOU    [facility] [place]

## 2019-07-25 NOTE — TELEPHONE ENCOUNTER
Called the number on file states again the the number is no longer in service. Patient is active on [employer], will send [employer] message to patient about MD response.     Lili Cormier RN, BSN  [facility]

## 2019-08-14 DIAGNOSIS — F41.9 ANXIETY: ICD-10-CM

## 2019-08-14 NOTE — TELEPHONE ENCOUNTER
Routing refill request to provider for review/approval because:  Patient needs to be seen because it has been more than 1 year since last office visit. LOV 10/30/17    Lili Cormier RN, BSN  [facility]

## 2019-08-14 NOTE — TELEPHONE ENCOUNTER
"Requested Prescriptions   Pending Prescriptions Disp Refills     PARoxetine (PAXIL) 10 MG tablet [Pharmacy Med Name: PAROXETINE 10MG TABLET] 90 tablet 1     Sig: TAKE 1 TAB BY MOUTH AT BEDTIME FOR ANXIETY  Last Written Prescription Date:  9/19/18  Last Fill Quantity: 90,  # refills: 1   Last office visit: 10/30/2017 with prescribing provider:  Adama   Future Office Visit:           SSRIs Protocol Failed - 8/14/2019  1:35 PM        Failed - Recent (12 mo) or future (30 days) visit within the authorizing provider's specialty     Patient had office visit in the last 12 months or has a visit in the next 30 days with authorizing provider or within the authorizing provider's specialty.  See \"Patient Info\" tab in inbasket, or \"Choose Columns\" in Meds & Orders section of the refill encounter.              Passed - Medication is active on med list        Passed - Patient is age 18 or older        Passed - No active pregnancy on record        Passed - No positive pregnancy test in last 12 months          "

## 2019-08-15 RX ORDER — PAROXETINE 10 MG/1
TABLET, FILM COATED ORAL
Qty: 90 TABLET | Refills: 1 | Status: SHIPPED | OUTPATIENT
Start: 2019-08-15 | End: 2020-01-29

## 2019-08-16 DIAGNOSIS — R45.1 AGITATION: ICD-10-CM

## 2019-08-16 NOTE — TELEPHONE ENCOUNTER
haloperidol (HALDOL) 1 MG tablet      Last Written Prescription Date:  11/1/18  Last Fill Quantity: 90,   # refills: 1  Last Office Visit: 10-30-17  Future Office visit:       Routing refill request to provider for review/approval because:  Drug not on the [facility], [facility] or [facility] refill protocol or controlled substance

## 2019-08-19 NOTE — TELEPHONE ENCOUNTER
Last Written Prescription Date:  5/1/19  Last Fill Quantity: 90 ml,  # refills: 0   Last office visit: 10/30/2017 with prescribing provider:  10/30/17   Future Office Visit:      Routing refill request to provider for review/approval because:  Drug not on the [facility] refill protocol   Patient needs to be seen because it has been more than 1 year since last office visit.  Yvonne Sotomayor RN

## 2019-08-20 RX ORDER — HALOPERIDOL 2 MG/ML
SOLUTION ORAL
Qty: 90 ML | Refills: 0 | Status: SHIPPED | OUTPATIENT
Start: 2019-08-20

## 2019-08-22 ENCOUNTER — TELEPHONE (OUTPATIENT)
Dept: FAMILY MEDICINE | Facility: CLINIC | Age: 84
End: 2019-08-22

## 2019-08-23 ENCOUNTER — TELEPHONE (OUTPATIENT)
Dept: FAMILY MEDICINE | Facility: CLINIC | Age: 84
End: 2019-08-23

## 2019-08-23 NOTE — TELEPHONE ENCOUNTER
Verbal order given for wound care Mon, Wed, Friday: medi-honey and foam adhesive. Yvonne Sotomayor RN

## 2019-08-23 NOTE — TELEPHONE ENCOUNTER
Left message for Mojgan to call back and speak with a TC. Pt was last seen Oct 30, 2017. Are those the notes she wants?  Elyse Encinas,

## 2019-08-27 NOTE — TELEPHONE ENCOUNTER
3 attempts to contact Mojgan with no return call. Pt has an appt with Dr Galan on 8/28  Elyse Encinas,

## 2019-08-28 ENCOUNTER — OFFICE VISIT (OUTPATIENT)
Dept: FAMILY MEDICINE | Facility: CLINIC | Age: 84
End: 2019-08-28
Payer: MEDICARE

## 2019-08-28 VITALS
DIASTOLIC BLOOD PRESSURE: 60 MMHG | TEMPERATURE: 97.5 F | HEIGHT: 61 IN | OXYGEN SATURATION: 97 % | BODY MASS INDEX: 24.19 KG/M2 | SYSTOLIC BLOOD PRESSURE: 112 MMHG | RESPIRATION RATE: 16 BRPM | HEART RATE: 94 BPM

## 2019-08-28 DIAGNOSIS — L89.522 PRESSURE INJURY OF LEFT ANKLE, STAGE 2 (H): ICD-10-CM

## 2019-08-28 DIAGNOSIS — D63.8 ANEMIA IN OTHER CHRONIC DISEASES CLASSIFIED ELSEWHERE: ICD-10-CM

## 2019-08-28 DIAGNOSIS — G30.8 ALZHEIMER'S DISEASE OF OTHER ONSET WITH BEHAVIORAL DISTURBANCE: Primary | ICD-10-CM

## 2019-08-28 DIAGNOSIS — E43 SEVERE PROTEIN-CALORIE MALNUTRITION (H): ICD-10-CM

## 2019-08-28 DIAGNOSIS — E78.5 HYPERLIPIDEMIA LDL GOAL <130: ICD-10-CM

## 2019-08-28 DIAGNOSIS — F02.818 ALZHEIMER'S DISEASE OF OTHER ONSET WITH BEHAVIORAL DISTURBANCE: Primary | ICD-10-CM

## 2019-08-28 PROCEDURE — 99214 OFFICE O/P EST MOD 30 MIN: CPT | Performed by: FAMILY MEDICINE

## 2019-08-28 NOTE — LETTER
[facility]  [address] [place] [id number]  Phone: [phone]    August 28, 2019        Miranda Burger  [address] [place] [address]   [facility] [id number]          To whom it may concern:    RE: Miranda Burger    Patient has been seen and treated today at our clinic.  Miranda has dry skin on all four extremities, please apply moisturizer twice daily on the bilateral upper and extremities.     Please contact me for questions or concerns.      Sincerely,        Cheng Galan MD

## 2019-08-28 NOTE — PROGRESS NOTES
Lana Burger is a 85 year old female who presents to clinic today for the following health issues:    HPI   Consult         Description (location/character/radiation): follow up. Needs face to face visit with PCP.           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     Dementia, vascular     Past Surgical History:   Procedure Laterality Date     BREAST BIOPSY, FNA RT/LT  95    Lt,      CHOLECYSTECTOMY  2014    Procedure: CHOLECYSTECTOMY;;  Surgeon: Hernandez Hancock MD;  Location:  OR     COLONOSCOPY  04     LAPAROSCOPIC ASSISTED COLECTOMY  2014    Procedure: LAPAROSCOPIC ASSISTED COLECTOMY;  LAPAROSCOPIC ASSISTED RIGHT HEMICOLECTOMY, CHOLECYSTECTOMY;  Surgeon: Hernandez Hancock MD;  Location:  OR     SURGICAL HISTORY OF -   74     SURGICAL HISTORY OF -       Excision large sebaceous cyst, posterior scalp, compound x2     SURGICAL HISTORY OF -   07    cataract os     TONSILLECTOMY         Social History     Tobacco Use     Smoking status: Never Smoker     Smokeless tobacco: Never Used   Substance Use Topics     Alcohol use: Yes     Comment: social     Family History   Problem Relation Age of Onset     Diabetes Mother         decesed     Diabetes Sister              Alzheimer Disease Mother         dementia     Cardiovascular Father         decesed         Current Outpatient Medications   Medication Sig Dispense Refill     ACETAMINOPHEN PO Take 325 mg by mouth daily       alum & mag hydroxide-simethicone (MINTOX) 200-200-20 MG/5ML SUSP  suspension Take by mouth every 4 hours as needed for indigestion       BENZONATATE PO Take 100 mg by mouth every 4 hours as needed for cough       Calcium Carbonate-Vitamin D (CALCIUM 600 + D OR) Take 2 capsules by mouth daily.       Dextromethorphan-guaiFENesin  MG/5ML syrup Take 5 mLs by mouth every 4 hours as needed for cough       ferrous sulfate (IRON) 325 (65 FE) MG tablet daily  0     haloperidol (HALDOL) 1 MG tablet TAKE 1 TAB BY MOUTH THREE TIMES DAILY FOR ANXIETY 90 tablet 1     haloperidol (HALDOL) 2 MG/ML (HIGH CONC) solution TAKE 0.5ML (1MG) BY MOUTH THREE TIMES DAILY FOR DEMENTIA W/BEHAVIORDISTURBANCE 90 mL 0     Homeopathic Products (ZICAM COLD REMEDY PO) Take by mouth as needed       magnesium hydroxide (MILK OF MAGNESIA) 400 MG/5ML suspension Take by mouth daily as needed for constipation or heartburn       MULTI-VITAMIN OR TABS None Entered       omeprazole (PRILOSEC) 20 MG CR capsule Take 1 capsule (20 mg) by mouth daily 30 capsule 5     PARoxetine (PAXIL) 10 MG tablet TAKE 1 TAB BY MOUTH AT BEDTIME FOR ANXIETY 90 tablet 1     VITAMIN D 1000 UNIT PO CAPS 2/day       fluocinonide (LIDEX) 0.05 % cream Apply sparingly to affected area twice daily for 14 days.  Do not apply to face. (Patient not taking: Reported on 10/30/2017) 30 g 1     Allergies   Allergen Reactions     Penicillin [Penicillins] Hives     Sulfa Drugs Hives     Recent Labs   Lab Test 03/20/19 05/02/18 10/30/17  1449 07/25/16  1201 04/30/15  1017 09/04/14  1203  11/09/12  1147   A1C  --   --   --   --   --   --   --  5.2   LDL 79  --   --  103* 81 64   < > 93   HDL 41  --   --  67  --  70   < > 53   TRIG 75  --   --  76  --  106   < > 64   ALT  --  20 18  --  23 18   < > 42   CR 0.83 0.87 0.89 0.91 0.94 0.89   < > 0.85   GFRESTIMATED >60 >60 60* 59* 57* 61   < > 65   GFRESTBLACK >60 >60 73 72 69 73   < > 78   POTASSIUM 4.3 4.4 4.7 4.4 4.5 4.5   < > 4.5   TSH  --   --  0.94  --   --  0.78   < > 0.49    < > = values in this  "interval not displayed.      BP Readings from Last 3 Encounters:   08/28/19 112/60   10/30/17 128/78   01/27/17 132/70    Wt Readings from Last 3 Encounters:   10/30/17 58.1 kg (128 lb)   01/27/17 57.6 kg (127 lb)   10/10/16 57.6 kg (127 lb)             Reviewed and updated as needed this visit by Provider         Review of Systems   ROS COMP: Constitutional, HEENT, cardiovascular, pulmonary, gi and gu systems are negative, except as otherwise noted.      Objective    /60 (Cuff Size: Adult Regular)   Pulse 94   Temp 97.5  F (36.4  C) (Tympanic)   Resp 16   Ht 1.549 m (5' 1\")   SpO2 97%   BMI 24.19 kg/m    Body mass index is 24.19 kg/m .  Physical Exam   GENERAL: healthy, alert and no distress  EYES: Eyes grossly normal to inspection, PERRL and conjunctivae and sclerae normal  HENT: ear canals and TM's normal, nose and mouth without ulcers or lesions  NECK: no adenopathy, no asymmetry, masses, or scars and thyroid normal to palpation  RESP: lungs clear to auscultation - no rales, rhonchi or wheezes  CV: regular rate and rhythm, normal S1 S2, no S3 or S4, no murmur, click or rub, no peripheral edema and peripheral pulses strong  ABDOMEN: soft, nontender, no hepatosplenomegaly, no masses and bowel sounds normal  MS: no gross musculoskeletal defects noted, no edema  SKIN: left ankle- pressure ulcer II, 1 x 2 cm, no sign of infection, no tunneling   NEURO: Normal strength and tone, mentation intact and speech normal          Assessment & Plan     1. Alzheimer's disease of other onset with behavioral disturbance  Has been stable without worsening behavioral change, will keep monitoring   However, her sx of alzheimer is at the terminal stage, she cannot move her torso and extremities without assistance   She also has very poor balance, need assistance at ambulation to prevent falling, she had multiple episode fall and head injury in the past     2. Pressure injury of left ankle, stage 2  Currently seeing wound " care nurse, has no sign of infection  Due to her current status of alzheimer's disease, she need home wound care visiting her NH    3. Anemia in other chronic diseases classified elsewhere  Had chronic anemia and malnutrition in the past, which could delay the wound healing process  Encourager her to keep on taking protein shake daily     4. Severe protein-calorie malnutrition (H)  Mentioned above     5. Hyperlipidemia LDL goal <130  Stable        FUTURE APPOINTMENTS:       - Follow-up visit in 6 months for general health check     No follow-ups on file.    Cheng Galan MD  [facility] [place]

## 2019-09-26 DIAGNOSIS — Z53.9 DIAGNOSIS NOT YET DEFINED: Primary | ICD-10-CM

## 2019-09-26 PROCEDURE — G0180 MD CERTIFICATION HHA PATIENT: HCPCS | Performed by: FAMILY MEDICINE

## 2019-10-17 ENCOUNTER — TELEPHONE (OUTPATIENT)
Dept: FAMILY MEDICINE | Facility: CLINIC | Age: 84
End: 2019-10-17

## 2019-10-17 DIAGNOSIS — G47.00 INSOMNIA, UNSPECIFIED TYPE: Primary | ICD-10-CM

## 2019-10-17 NOTE — TELEPHONE ENCOUNTER
Reason for call:  Patient reporting a symptom    Symptom or request: increased sleeping    Duration (how long have symptoms been present): unsure onset, but noticeable since last week    Have you been treated for this before? n/a    Additional comments: Patient's daughter Aranza arteaga, CTC filed. States they are concerned about patient's sleeping habits. Patient is in memory care and has been waking up at night and walking around and sleeping a lot during the day. They would like to know if there is anything they can do so patient can have a full night of sleep.    Phone Number patient can be reached at:  Other phone number:  [phone] Aranza  [phone] Mary Ann (patient's other daughter)    Best Time:  anytime    Can we leave a detailed message on this number:  NO    Call taken on 10/17/2019 at 1:58 PM by Oniel MCCARTHY

## 2019-10-18 RX ORDER — TRAZODONE HYDROCHLORIDE 50 MG/1
50 TABLET, FILM COATED ORAL AT BEDTIME
Qty: 90 TABLET | Refills: 0 | Status: SHIPPED | OUTPATIENT
Start: 2019-10-18

## 2019-10-18 NOTE — TELEPHONE ENCOUNTER
Called Aranza (daughter has CTC).Left a non-detailed message and call back number [phone].   Called aMry Ann daughter (has CTC)  Mary Ann gave pharmacy information.    Pharmacy pended, please review and sign.    Mary Ann stated we do not need to call her back once order is placed for medication.     Lili Cormier RN, BSN  [facility] [place]

## 2019-10-18 NOTE — TELEPHONE ENCOUNTER
Left non-detailed message to call the clinic back at [phone] and ask to speak with a  triage nurse.   Yvonne Sotomayor RN

## 2019-11-18 DIAGNOSIS — Z53.9 DIAGNOSIS NOT YET DEFINED: Primary | ICD-10-CM

## 2019-11-18 PROCEDURE — G0179 MD RECERTIFICATION HHA PT: HCPCS | Performed by: FAMILY MEDICINE

## 2019-11-29 ENCOUNTER — TELEPHONE (OUTPATIENT)
Dept: FAMILY MEDICINE | Facility: CLINIC | Age: 84
End: 2019-11-29

## 2019-11-29 DIAGNOSIS — R52 PAIN: Primary | ICD-10-CM

## 2019-11-29 DIAGNOSIS — R19.7 DIARRHEA: ICD-10-CM

## 2019-11-29 DIAGNOSIS — G47.00 INSOMNIA: ICD-10-CM

## 2019-11-29 DIAGNOSIS — R10.13 DYSPEPSIA: ICD-10-CM

## 2019-11-29 DIAGNOSIS — K59.00 CONSTIPATION: ICD-10-CM

## 2019-11-29 DIAGNOSIS — R05.9 COUGH: ICD-10-CM

## 2019-11-29 NOTE — TELEPHONE ENCOUNTER
Reason for Call:  Form, our goal is to have forms completed with 72 hours, however, some forms may require a visit or additional information.    Type of letter, form or note:  Home Health Certification    Who is the form from?: Home care    Where did the form come from: form was faxed in    What clinic location was the form placed at?: [facility]    Where the form was placed: Given to Yvonne Sotomayor RN    What number is listed as a contact on the form?: [phone]       Additional comments: na    Call taken on 11/29/2019 at 3:27 PM by Elyse Encinas

## 2019-12-03 NOTE — TELEPHONE ENCOUNTER
MED REC DONE     Discrepancies:      acetaminophen           Epic: take 325 mg daily           Form:  Take 1 tablet, 325 mg by oral route every 4 hours as needed. If one tab ineffective may increase to 2 tabs= 650 mg. Do not exceed 3,000 mg in 24 hours       Benzonatate 100 mg capsule   Epic: take 100 mg by mouther every 4 hours as needed for cough    Form: take 1 capsule by oral route 2 times per day as needed for cough    Meds on Epic but NOT  on Form:       Calcium carbonate-vitamin D (calcium 600+D), take 2 capsules by mouth daily                Ferrous sulfate (iron) 325 (65 Fe) mg tablet daily                Lidex cream - reported as not taking 10/30/17                 Haloperidol (Haldol) 1 mg tablet, take 1 tab by mouth three times daily for anxiety (also has liquid haloperidol on both Epic and form list)                               Omeprazole 20 mg CR capsule, take 1 capsule  (20 mg) by mouth daily                   Trazodone (Desyrel) 50 mg tablet, take 1 tablet (50 mg) by mouth at bedtime                  Multi vitamin (no directions entered)                             Vitamin D 1000 unit PO caps, 2/day                  Zicam cold remedy PO, take by mouth as needed       Meds on Form but NOT on Epic :     Melatonin 3 mg tablet, take 1 tab by oral route every evening for insomnia                        Citrucel (sucrose) oral powder, take 1 TBSP by oral route every morning as needed Reason: constipation (dissolve in 8 oz water once daily for loose stools.                   Nutritional drink oral liquid, take 8 oz by oral route every morning                  Calcium antacid 200 mg calcium (500 mg) chewable, take 2 tabs by oral route 2 times per day as needed Reason: dyspepsia                  Loperamide 2 mg tablet, take 2 tablets (4 mg) by oral router as needed Reason: diarrhea, take 1 tab (2 mg) after each subsequent loose stool. Max dose 4 tabs per day for 2 days for diarrhea    Routing to PCP for  further review/recommendations/orders  Yvonne Sotomayor RN

## 2019-12-04 RX ORDER — LOPERAMIDE HYDROCHLORIDE 2 MG/1
2 TABLET ORAL 4 TIMES DAILY PRN
Start: 2019-12-04

## 2019-12-04 RX ORDER — LANOLIN ALCOHOL/MO/W.PET/CERES
3 CREAM (GRAM) TOPICAL
Start: 2019-12-04

## 2019-12-04 RX ORDER — BENZONATATE 100 MG/1
100 CAPSULE ORAL 2 TIMES DAILY PRN
Start: 2019-12-04

## 2019-12-04 RX ORDER — CALCIUM CARBONATE 500 MG/1
2 TABLET, CHEWABLE ORAL 2 TIMES DAILY PRN
Start: 2019-12-04

## 2019-12-04 RX ORDER — ACETAMINOPHEN 325 MG/1
325 TABLET ORAL EVERY 4 HOURS PRN
Start: 2019-12-04

## 2019-12-04 NOTE — TELEPHONE ENCOUNTER
Please clarify if OK for liquid Haloperidol. Liquid haloperidol is on both Epic and form list. Haloperidol tablet is only on Epic list.     OK to discontinue haloperidol tablet on Epic list?    Form and Epic update to reflect other changes to medication list noted below.    Yvonne Sotomayor RN

## 2019-12-04 NOTE — TELEPHONE ENCOUNTER
Discrepancies:      acetaminophen                     Form:  Take 1 tablet, 325 mg by oral route every 4 hours as needed. If one tab ineffective may increase to 2 tabs= 650 mg. Do not exceed 3,000 mg in 24 hours       Benzonatate 100 mg capsule       Form: take 1 capsule by oral route 2 times per day as needed for cough    Meds on Epic but NOT  on Form:       Calcium carbonate-vitamin D (calcium 600+D), take 2 capsules by mouth daily                                                Haloperidol (Haldol) 1 mg tablet, take 1 tab by mouth three times daily for anxiety (also has liquid haloperidol on both Epic and form list)                                                  Trazodone (Desyrel) 50 mg tablet, take 1 tablet (50 mg) by mouth at bedtime                  Multi vitamin (no directions entered)                                                      Meds on Form but NOT on Epic :     Melatonin 3 mg tablet, take 1 tab by oral route every evening for insomnia                        Citrucel (sucrose) oral powder, take 1 TBSP by oral route every morning as needed Reason: constipation (dissolve in 8 oz water once daily for loose stools.                   Nutritional drink oral liquid, take 8 oz by oral route every morning                  Calcium antacid 200 mg calcium (500 mg) chewable, take 2 tabs by oral route 2 times per day as needed Reason: dyspepsia                  Loperamide 2 mg tablet, take 2 tablets (4 mg) by oral router as needed Reason: diarrhea, take 1 tab (2 mg) after each subsequent loose stool. Max dose 4 tabs per day for 2 days for diarrhea

## 2019-12-05 NOTE — TELEPHONE ENCOUNTER
Form and Epic medication list updated. Updated Epic list and form returned to Dr. Galan for signature. Yvonne Sotomayor RN

## 2019-12-09 DIAGNOSIS — Z53.9 DIAGNOSIS NOT YET DEFINED: Primary | ICD-10-CM

## 2019-12-09 PROCEDURE — G0179 MD RECERTIFICATION HHA PT: HCPCS | Performed by: FAMILY MEDICINE

## 2019-12-10 NOTE — TELEPHONE ENCOUNTER
Completed, faxed to home care facility, and scanned to chart.      .Annika ZHOU    [facility]

## 2020-01-02 NOTE — TELEPHONE ENCOUNTER
S/w Carly at [facility] in [place] and they need a new rx for Haldol 1 tab tid for anxiety.  Old rx was bid that was sent in June 2018 but cannot use since sig is wrong.    Dr. Galan please send in new rx for Haldol 1 mg 1 tab tid and update med list.    Pharmacy pended.    Sherie Mascorro RN  EP Triage     No

## 2020-01-29 DIAGNOSIS — F41.9 ANXIETY: ICD-10-CM

## 2020-01-29 RX ORDER — PAROXETINE 10 MG/1
TABLET, FILM COATED ORAL
Qty: 90 TABLET | Refills: 1 | Status: SHIPPED | OUTPATIENT
Start: 2020-01-29 | End: 2020-07-16

## 2020-01-29 NOTE — TELEPHONE ENCOUNTER
"Last Written Prescription Date:  8/15/19  Last Fill Quantity: 90,  # refills: 1   Last office visit: 8/28/2019 with prescribing provider: Adama   Future Office Visit:      Requested Prescriptions   Pending Prescriptions Disp Refills     PARoxetine (PAXIL) 10 MG tablet [Pharmacy Med Name: PAROXETINE 10MG TABLET] 90 tablet 1     Sig: TAKE 1 TAB BY MOUTH AT BEDTIME FOR ANXIETY       SSRIs Protocol Passed - 1/29/2020  3:20 PM        Passed - Recent (12 mo) or future (30 days) visit within the authorizing provider's specialty     Patient has had an office visit with the authorizing provider or a provider within the authorizing providers department within the previous 12 mos or has a future within next 30 days. See \"Patient Info\" tab in inbasket, or \"Choose Columns\" in Meds & Orders section of the refill encounter.              Passed - Medication is active on med list        Passed - Patient is age 18 or older        Passed - No active pregnancy on record        Passed - No positive pregnancy test in last 12 months          "

## 2020-01-29 NOTE — TELEPHONE ENCOUNTER
Prescription approved per [facility] [place] Refill Protocol.    Lili Cormier RN, BSN  [facility] [place]

## 2020-02-16 ENCOUNTER — HEALTH MAINTENANCE LETTER (OUTPATIENT)
Age: 85
End: 2020-02-16

## 2020-07-16 DIAGNOSIS — F41.9 ANXIETY: ICD-10-CM

## 2020-07-16 RX ORDER — PAROXETINE 10 MG/1
TABLET, FILM COATED ORAL
Qty: 90 TABLET | Refills: 0 | Status: SHIPPED | OUTPATIENT
Start: 2020-07-16

## 2020-07-16 NOTE — TELEPHONE ENCOUNTER
Routing refill request to provider for review/approval because:  Drug interaction warning between trazodone and paroxetine.    Sherie JACOBO RN  EP Triage

## 2020-11-22 ENCOUNTER — HEALTH MAINTENANCE LETTER (OUTPATIENT)
Age: 85
End: 2020-11-22

## 2021-01-01 ENCOUNTER — HEALTH MAINTENANCE LETTER (OUTPATIENT)
Age: 86
End: 2021-01-01

## 2021-04-04 ENCOUNTER — HEALTH MAINTENANCE LETTER (OUTPATIENT)
Age: 86
End: 2021-04-04

## 2022-04-30 ENCOUNTER — HEALTH MAINTENANCE LETTER (OUTPATIENT)
Age: 87
End: 2022-04-30